# Patient Record
Sex: FEMALE | Race: AMERICAN INDIAN OR ALASKA NATIVE
[De-identification: names, ages, dates, MRNs, and addresses within clinical notes are randomized per-mention and may not be internally consistent; named-entity substitution may affect disease eponyms.]

---

## 2018-05-26 ENCOUNTER — HOSPITAL ENCOUNTER (INPATIENT)
Dept: HOSPITAL 5 - ED | Age: 73
LOS: 3 days | Discharge: HOME | DRG: 436 | End: 2018-05-29
Attending: INTERNAL MEDICINE | Admitting: INTERNAL MEDICINE
Payer: MEDICARE

## 2018-05-26 DIAGNOSIS — C78.7: ICD-10-CM

## 2018-05-26 DIAGNOSIS — I10: ICD-10-CM

## 2018-05-26 DIAGNOSIS — Z23: ICD-10-CM

## 2018-05-26 DIAGNOSIS — Z90.721: ICD-10-CM

## 2018-05-26 DIAGNOSIS — E11.9: ICD-10-CM

## 2018-05-26 DIAGNOSIS — E87.1: ICD-10-CM

## 2018-05-26 DIAGNOSIS — C25.9: Primary | ICD-10-CM

## 2018-05-26 DIAGNOSIS — Z79.4: ICD-10-CM

## 2018-05-26 LAB
ALBUMIN SERPL-MCNC: 3.7 G/DL (ref 3.9–5)
ALT SERPL-CCNC: 10 UNITS/L (ref 7–56)
BASOPHILS # (AUTO): 0.1 K/MM3 (ref 0–0.1)
BASOPHILS NFR BLD AUTO: 0.9 % (ref 0–1.8)
BILIRUB UR QL STRIP: (no result)
BLOOD UR QL VISUAL: (no result)
BUN SERPL-MCNC: 7 MG/DL (ref 7–17)
BUN/CREAT SERPL: 18 %
CALCIUM SERPL-MCNC: 8.7 MG/DL (ref 8.4–10.2)
EOSINOPHIL # BLD AUTO: 0.1 K/MM3 (ref 0–0.4)
EOSINOPHIL NFR BLD AUTO: 1.2 % (ref 0–4.3)
HCT VFR BLD CALC: 41.9 % (ref 30.3–42.9)
HEMOLYSIS INDEX: 0
HGB BLD-MCNC: 14 GM/DL (ref 10.1–14.3)
HYALINE CASTS #/AREA URNS LPF: 1 /LPF
LYMPHOCYTES # BLD AUTO: 1.8 K/MM3 (ref 1.2–5.4)
LYMPHOCYTES NFR BLD AUTO: 24.7 % (ref 13.4–35)
MCH RBC QN AUTO: 29 PG (ref 28–32)
MCHC RBC AUTO-ENTMCNC: 33 % (ref 30–34)
MCV RBC AUTO: 88 FL (ref 79–97)
MONOCYTES # (AUTO): 0.8 K/MM3 (ref 0–0.8)
MONOCYTES % (AUTO): 10.2 % (ref 0–7.3)
PH UR STRIP: 5 [PH] (ref 5–7)
PLATELET # BLD: 193 K/MM3 (ref 140–440)
PROT UR STRIP-MCNC: (no result) MG/DL
RBC # BLD AUTO: 4.79 M/MM3 (ref 3.65–5.03)
RBC #/AREA URNS HPF: 3 /HPF (ref 0–6)
UROBILINOGEN UR-MCNC: < 2 MG/DL (ref ?–2)
WBC #/AREA URNS HPF: 2 /HPF (ref 0–6)

## 2018-05-26 PROCEDURE — 85027 COMPLETE CBC AUTOMATED: CPT

## 2018-05-26 PROCEDURE — 80048 BASIC METABOLIC PNL TOTAL CA: CPT

## 2018-05-26 PROCEDURE — 82962 GLUCOSE BLOOD TEST: CPT

## 2018-05-26 PROCEDURE — A9577 INJ MULTIHANCE: HCPCS

## 2018-05-26 PROCEDURE — 90732 PPSV23 VACC 2 YRS+ SUBQ/IM: CPT

## 2018-05-26 PROCEDURE — 96374 THER/PROPH/DIAG INJ IV PUSH: CPT

## 2018-05-26 PROCEDURE — 36415 COLL VENOUS BLD VENIPUNCTURE: CPT

## 2018-05-26 PROCEDURE — 74177 CT ABD & PELVIS W/CONTRAST: CPT

## 2018-05-26 PROCEDURE — 74183 MRI ABD W/O CNTR FLWD CNTR: CPT

## 2018-05-26 PROCEDURE — 86304 IMMUNOASSAY TUMOR CA 125: CPT

## 2018-05-26 PROCEDURE — 85025 COMPLETE CBC W/AUTO DIFF WBC: CPT

## 2018-05-26 PROCEDURE — 86301 IMMUNOASSAY TUMOR CA 19-9: CPT

## 2018-05-26 PROCEDURE — 83036 HEMOGLOBIN GLYCOSYLATED A1C: CPT

## 2018-05-26 PROCEDURE — 80053 COMPREHEN METABOLIC PANEL: CPT

## 2018-05-26 PROCEDURE — 81001 URINALYSIS AUTO W/SCOPE: CPT

## 2018-05-26 NOTE — EMERGENCY DEPARTMENT REPORT
HPI





- General


Chief Complaint: Abdominal Pain


Time Seen by Provider: 18 09:18





- HPI


HPI: 





Room 18





The patient is a 72-year-old female presenting with the chief complaint of 

abdominal pain.  The patient states she's had diffuse abdominal pain since 

February of this year.  Patient describes the pain as aching with intermittent 

sharp components.  The patient states her pain improved after being changed to 

a different diabetes medication but returned and has been constant since 2018.  Patient admits to occasional nausea but denies vomiting or diarrhea.  

Patient states she's been constipated the last 2-3 weeks.  The patient states 

her last BM occurred approximately 2 days ago after taking a laxative.  Patient 

denies any history of fever or dysuria.  The patient states ibuprofen helps "a 

little."





Location: Abdomen


Duration: Months


Quality: Aching/sharp


Severity: 9/10


Modifying factors: [see above]


Context: [see above]


Mode of transportation: [not driving]





ED Past Medical Hx





- Past Medical History


Previous Medical History?: Yes


Hx Hypertension: Yes


Hx Diabetes: Yes





- Surgical History


Past Surgical History?: No


Additional Surgical History:  x 2.  right oophorectomy.  tonsilectomy





- Family History


Family history: no significant





- Social History


Smoking Status: Never Smoker


Substance Use Type: Alcohol (occasional)





- Medications


Home Medications: 


 Home Medications











 Medication  Instructions  Recorded  Confirmed  Last Taken  Type


 


Carvedilol [Coreg] 12.5 mg PO BID 13 05:00 History


 


Carvedilol [Coreg] 25 mg PO BID #90 tablet 13  Unknown Rx


 


Glimepiride [Amaryl] 4 mg PO QAM 13 05:00 History


 


Losartan/Hydrochlorothiazide 1 each PO QDAY 13 09:30 

History





[Hyzaar 100-25 Tablet]     


 


Sitagliptin Phos/Metformin HCl 1 each PO BID 13 18:00 

History





[Janumet  mg Tablet]     


 


amLODIPine [Norvasc] 5 mg PO DAILY #90 tab 13  Unknown Rx


 


traMADol [Ultram 50 MG tab] 50 mg PO Q6HR PRN #20 tablet 08/27/15  Unknown Rx














ED Review of Systems


ROS: 


Stated complaint: ABD/BACK PAIN


Other details as noted in HPI





Constitutional: denies: fever


Gastrointestinal: abdominal pain, nausea, constipation.  denies: vomiting, 

diarrhea





Physical Exam





- Physical Exam


Vital Signs: 


 Vital Signs











  18





  07:18


 


Temperature 97.5 F L


 


Pulse Rate 118 H


 


Respiratory 20





Rate 


 


Blood Pressure 202/118


 


O2 Sat by Pulse 96





Oximetry 











Physical Exam: 





GENERAL: The patient is well-developed well-nourished female lying on stretcher 

not appearing to be in acute distress. []


HEENT: Normocephalic.  Atraumatic.  Extraocular motions are intact.  Patient 

has moist mucous membranes.


NECK: Supple.  Trachea midline


CHEST/LUNGS: Clear to auscultation.  There is no respiratory distress noted.


HEART/CARDIOVASCULAR: Regular.  There is no tachycardia.  There is no gallop 

rub or murmur.


ABDOMEN: Abdomen is soft, with mild discomfort to palpation in the right lower 

quadrant, suprapubic and left lower quadrant.  Patient has normal bowel sounds.

  There is no abdominal distention.


SKIN: There is no rash.  There is no edema.  There is no diaphoresis.


NEURO: The patient is awake, alert, and oriented.  The patient is cooperative.  

The patient has normal speech


MUSCULOSKELETAL: There is no CVA tenderness.  There is no evidence of acute 

injury.





ED Course


 Vital Signs











  18





  07:18


 


Temperature 97.5 F L


 


Pulse Rate 118 H


 


Respiratory 20





Rate 


 


Blood Pressure 202/118


 


O2 Sat by Pulse 96





Oximetry 














- Consultations


Consultation #1: 





18 12:24


Patient's primary physician Dr. Laina cuenca





ED Medical Decision Making





- Lab Data


Result diagrams: 


 18 07:24





 18 07:24





 Laboratory Tests











  18





  07:24 07:24 07:36


 


WBC  7.4  


 


RBC  4.79  


 


Hgb  14.0  


 


Hct  41.9  


 


MCV  88  


 


MCH  29  


 


MCHC  33  


 


RDW  12.3 L  


 


Plt Count  193  


 


Lymph % (Auto)  24.7  


 


Mono % (Auto)  10.2 H  


 


Eos % (Auto)  1.2  


 


Baso % (Auto)  0.9  


 


Lymph #  1.8  


 


Mono #  0.8  


 


Eos #  0.1  


 


Baso #  0.1  


 


Seg Neutrophils %  63.0  


 


Seg Neutrophils #  4.7  


 


Sodium   131 L 


 


Potassium   3.6 


 


Chloride   90.9 L 


 


Carbon Dioxide   25 


 


Anion Gap   19 


 


BUN   7 


 


Creatinine   0.4 L 


 


Estimated GFR   > 60 


 


BUN/Creatinine Ratio   18 


 


Glucose   208 H 


 


Calcium   8.7 


 


Total Bilirubin   0.40 


 


AST   13 


 


ALT   10 


 


Alkaline Phosphatase   79 


 


Total Protein   6.5 


 


Albumin   3.7 L 


 


Albumin/Globulin Ratio   1.3 


 


Urine Color    Yellow


 


Urine Turbidity    Clear


 


Urine pH    5.0


 


Ur Specific Gravity    1.020


 


Urine Protein    <15 mg/dl


 


Urine Glucose (UA)    >=500


 


Urine Ketones    Tr


 


Urine Blood    Neg


 


Urine Nitrite    Neg


 


Urine Bilirubin    Neg


 


Urine Urobilinogen    < 2.0


 


Ur Leukocyte Esterase    Neg


 


Urine WBC (Auto)    2.0


 


Urine RBC (Auto)    3.0


 


U Epithel Cells (Auto)    1.0


 


Hyaline Casts    1














- Radiology Data


Radiology results: report reviewed (CT abdomen and pelvis), image reviewed (CT 

abdomen and pelvis)





CT abdomen and pelvis (discussed with radiologist)-pancreatic mass with 

multiple lesions in the liver concerning for metastatic disease





- Differential Diagnosis


constipation, diverticulitis, irritable bowel syndrome, Crohn's disease


Critical care attestation.: 


If time is entered above; I have spent that time in minutes in the direct care 

of this critically ill patient, excluding procedure time.








ED Disposition


Clinical Impression: 


 Abdominal pain, Pancreatic mass, Liver lesion





Disposition:  OP ADMIT IP TO THIS HOSP


Is pt being admited?: Yes


Does the pt Need Aspirin: Yes


Condition: Fair


Instructions:  Abdominal Pain (ED)


Referrals: 


MARIFER ESQUEDA MD [Primary Care Provider] - 3-5 Days


Time of Disposition: 12:28 (hospitalist notified (Dr Esqueda))

## 2018-05-27 LAB
ALBUMIN SERPL-MCNC: 3.1 G/DL (ref 3.9–5)
ALT SERPL-CCNC: 9 UNITS/L (ref 7–56)
BASOPHILS # (AUTO): 0.1 K/MM3 (ref 0–0.1)
BASOPHILS NFR BLD AUTO: 1.2 % (ref 0–1.8)
BUN SERPL-MCNC: 10 MG/DL (ref 7–17)
BUN/CREAT SERPL: 17 %
CALCIUM SERPL-MCNC: 8.5 MG/DL (ref 8.4–10.2)
EOSINOPHIL # BLD AUTO: 0.1 K/MM3 (ref 0–0.4)
EOSINOPHIL NFR BLD AUTO: 2.7 % (ref 0–4.3)
HCT VFR BLD CALC: 36.5 % (ref 30.3–42.9)
HEMOLYSIS INDEX: 2
HGB BLD-MCNC: 12.9 GM/DL (ref 10.1–14.3)
LYMPHOCYTES # BLD AUTO: 1 K/MM3 (ref 1.2–5.4)
LYMPHOCYTES NFR BLD AUTO: 19.4 % (ref 13.4–35)
MCH RBC QN AUTO: 30 PG (ref 28–32)
MCHC RBC AUTO-ENTMCNC: 35 % (ref 30–34)
MCV RBC AUTO: 86 FL (ref 79–97)
MONOCYTES # (AUTO): 0.5 K/MM3 (ref 0–0.8)
MONOCYTES % (AUTO): 9.1 % (ref 0–7.3)
PLATELET # BLD: 156 K/MM3 (ref 140–440)
RBC # BLD AUTO: 4.27 M/MM3 (ref 3.65–5.03)

## 2018-05-27 PROCEDURE — 3E0234Z INTRODUCTION OF SERUM, TOXOID AND VACCINE INTO MUSCLE, PERCUTANEOUS APPROACH: ICD-10-PCS | Performed by: INTERNAL MEDICINE

## 2018-05-27 RX ADMIN — CARVEDILOL SCH MG: 25 TABLET, FILM COATED ORAL at 00:20

## 2018-05-27 RX ADMIN — Medication SCH ML: at 10:54

## 2018-05-27 RX ADMIN — CARVEDILOL SCH MG: 25 TABLET, FILM COATED ORAL at 10:46

## 2018-05-27 RX ADMIN — Medication SCH ML: at 23:30

## 2018-05-27 RX ADMIN — HYDROCHLOROTHIAZIDE SCH: 25 TABLET ORAL at 10:51

## 2018-05-27 RX ADMIN — LOSARTAN POTASSIUM SCH MG: 50 TABLET, FILM COATED ORAL at 10:45

## 2018-05-27 RX ADMIN — SODIUM CHLORIDE SCH MLS/HR: 0.9 INJECTION, SOLUTION INTRAVENOUS at 23:44

## 2018-05-27 RX ADMIN — ACETAMINOPHEN PRN MG: 325 TABLET ORAL at 17:21

## 2018-05-27 RX ADMIN — CARVEDILOL SCH MG: 25 TABLET, FILM COATED ORAL at 23:31

## 2018-05-27 RX ADMIN — AMLODIPINE BESYLATE SCH MG: 5 TABLET ORAL at 10:45

## 2018-05-27 RX ADMIN — HYDROCHLOROTHIAZIDE SCH MG: 25 TABLET ORAL at 10:45

## 2018-05-27 NOTE — EVENT NOTE
Date: 05/27/18


- full consult dictated


- based on ct scan consider liver lesion bx vs EUS as outpt


- will follow

## 2018-05-27 NOTE — PROGRESS NOTE
Assessment and Plan


Assessment and plan: 


Ms. Turner is 71 yo woman with a history of IDDM, hypertension who pw abd pains. 

Admitted to investigate pancreatic mass with suspected liver mets





-Suspect pancreatic cancer with liver mets: will eventually need bx and EUS 

which is not done here


-Abd pains: mri pending, gi is following


-IDDM; ssi, ada diet


-Hypertension: low salt diet, continue antihypertensives.








History


Interval history: 


Patient was seen and examined. Follow-up on current diagnosis. Overnight 

uneventful. Patient denies any chest pain, shortness breath, nausea/vomiting or 

severe headaches. Imaging, nursing note, chart, labs and old chart reviewed. 

Discussed with patient. 











Hospitalist Physical





- Physical exam


Narrative exam: 


GEN: WDWN, NAD, Awake, Alert, Orientated x 3


HEENT: NCAT, EOMI, PERRL, OP Clear 


NECK: supple, no adenopathy, no thyromegaly, no JVD 


CVS/HEART: RRR, normal S1S2, pulses present bilaterally 


CHEST/LUNGS: CTA B, Symmetrical chest expansion, good air entry bilaterally 


GI/Abdomen: soft, distended, diffuse tenderness good bowel sounds, no guarding 

or rebound 


/Bladder: no suprapubic tenderness, no CVA or paraspinal tenderness 


EXT/Skin: no c/c/e, no obvious rash 


MSK: FROM x 4 


Neuro: CN 2-12 grossly intact, no new focal deficits 


Psych: calm








- Constitutional


Vitals: 


 











Temp Pulse Resp BP Pulse Ox


 


 98.4 F   93 H  19   139/79   100 


 


 05/27/18 07:00  05/27/18 07:00  05/27/18 07:00  05/27/18 10:46  05/27/18 07:00














Results





- Labs


CBC & Chem 7: 


 05/27/18 04:58





 05/27/18 04:58


Labs: 


 Laboratory Last Values











WBC  5.4 K/mm3 (4.5-11.0)   05/27/18  04:58    


 


RBC  4.27 M/mm3 (3.65-5.03)   05/27/18  04:58    


 


Hgb  12.9 gm/dl (10.1-14.3)   05/27/18  04:58    


 


Hct  36.5 % (30.3-42.9)   05/27/18  04:58    


 


MCV  86 fl (79-97)   05/27/18  04:58    


 


MCH  30 pg (28-32)   05/27/18  04:58    


 


MCHC  35 % (30-34)  H  05/27/18  04:58    


 


RDW  12.4 % (13.2-15.2)  L  05/27/18  04:58    


 


Plt Count  156 K/mm3 (140-440)   05/27/18  04:58    


 


Lymph % (Auto)  19.4 % (13.4-35.0)   05/27/18  04:58    


 


Mono % (Auto)  9.1 % (0.0-7.3)  H  05/27/18  04:58    


 


Eos % (Auto)  2.7 % (0.0-4.3)   05/27/18  04:58    


 


Baso % (Auto)  1.2 % (0.0-1.8)   05/27/18  04:58    


 


Lymph #  1.0 K/mm3 (1.2-5.4)  L  05/27/18  04:58    


 


Mono #  0.5 K/mm3 (0.0-0.8)   05/27/18  04:58    


 


Eos #  0.1 K/mm3 (0.0-0.4)   05/27/18  04:58    


 


Baso #  0.1 K/mm3 (0.0-0.1)   05/27/18  04:58    


 


Seg Neutrophils %  67.6 % (40.0-70.0)   05/27/18  04:58    


 


Seg Neutrophils #  3.7 K/mm3 (1.8-7.7)   05/27/18  04:58    


 


Sodium  136 mmol/L (137-145)  L  05/27/18  04:58    


 


Potassium  4.6 mmol/L (3.6-5.0)  D 05/27/18  04:58    


 


Chloride  97.4 mmol/L ()  L  05/27/18  04:58    


 


Carbon Dioxide  23 mmol/L (22-30)   05/27/18  04:58    


 


Anion Gap  20 mmol/L  05/27/18  04:58    


 


BUN  10 mg/dL (7-17)   05/27/18  04:58    


 


Creatinine  0.6 mg/dL (0.7-1.2)  L  05/27/18  04:58    


 


Estimated GFR  > 60 ml/min  05/27/18  04:58    


 


BUN/Creatinine Ratio  17 %  05/27/18  04:58    


 


Glucose  212 mg/dL ()  H  05/27/18  04:58    


 


POC Glucose  224  ()  H  05/27/18  06:12    


 


Hemoglobin A1c  8.5 % (4-6)  H  05/26/18  23:42    


 


Calcium  8.5 mg/dL (8.4-10.2)   05/27/18  04:58    


 


Total Bilirubin  0.50 mg/dL (0.1-1.2)   05/27/18  04:58    


 


AST  11 units/L (5-40)   05/27/18  04:58    


 


ALT  9 units/L (7-56)   05/27/18  04:58    


 


Alkaline Phosphatase  70 units/L ()   05/27/18  04:58    


 


Total Protein  5.6 g/dL (6.3-8.2)  L  05/27/18  04:58    


 


Albumin  3.1 g/dL (3.9-5)  L  05/27/18  04:58    


 


Albumin/Globulin Ratio  1.2 %  05/27/18  04:58    


 


Urine Color  Yellow  (Yellow)   05/26/18  07:36    


 


Urine Turbidity  Clear  (Clear)   05/26/18  07:36    


 


Urine pH  5.0  (5.0-7.0)   05/26/18  07:36    


 


Ur Specific Gravity  1.020  (1.003-1.030)   05/26/18  07:36    


 


Urine Protein  <15 mg/dl mg/dL (Negative)   05/26/18  07:36    


 


Urine Glucose (UA)  >=500 mg/dL (Negative)   05/26/18  07:36    


 


Urine Ketones  Tr mg/dL (Negative)   05/26/18  07:36    


 


Urine Blood  Neg  (Negative)   05/26/18  07:36    


 


Urine Nitrite  Neg  (Negative)   05/26/18  07:36    


 


Urine Bilirubin  Neg  (Negative)   05/26/18  07:36    


 


Urine Urobilinogen  < 2.0 mg/dL (<2.0)   05/26/18  07:36    


 


Ur Leukocyte Esterase  Neg  (Negative)   05/26/18  07:36    


 


Urine WBC (Auto)  2.0 /HPF (0.0-6.0)   05/26/18  07:36    


 


Urine RBC (Auto)  3.0 /HPF (0.0-6.0)   05/26/18  07:36    


 


U Epithel Cells (Auto)  1.0 /HPF (0-13.0)   05/26/18  07:36    


 


Hyaline Casts  1 /LPF  05/26/18  07:36

## 2018-05-27 NOTE — HEM/ONC CONSULTATION
History of Present Illness





- Reason for Consult


Consult date: 18


Requesting physician: PRAVEEN BUSCH





- History of Present Illness





73 yo patient who appears younger than stated age presents with epigastric 

pain. 


Report from CT done in ER suggests Pancreatic mass with liver lesions. 


CA 19-9 pending


- Recommend CT guided bx 


- If discharged, please followup with Dr Amarilys Jauregui,  I am concerned patient 

will be lost to followup. 





Past History


Past Medical History: diabetes, hypertension


Past Surgical History:  (- right oophorectomy)


Social history: no significant social history


Family history: no significant family history





Medications and Allergies


 Allergies











Allergy/AdvReac Type Severity Reaction Status Date / Time


 


No Known Allergies Allergy   Verified 18 07:18











 Home Medications











 Medication  Instructions  Recorded  Confirmed  Last Taken  Type


 


Losartan/Hydrochlorothiazide 100 mg PO QDAY 13 17:24 

History





[Hyzaar 100-25 Tablet]     


 


amLODIPine [Norvasc] 5 mg PO DAILY #90 tab 13 Rx


 


Carvedilol [Coreg] 25 mg PO BID 18 History


 


HumaLOG Mix 75/25 Vial 20 units SUB-Q QHS 18 History


 


HumaLOG Mix 75/25 Vial 30 units SUB-Q QAM 18 History











Active Meds: 


Active Medications





Acetaminophen (Tylenol)  650 mg PO Q4H PRN


   PRN Reason: Pain MILD(1-3)/Fever >100.5/HA


Amlodipine Besylate (Norvasc)  5 mg PO DAILY [facility]


   Last Admin: 18 10:45 Dose:  5 mg


Carvedilol (Coreg)  25 mg PO BID [facility]


   Last Admin: 18 10:46 Dose:  25 mg


Hydrochlorothiazide (Hctz)  25 mg PO QDAY [facility]


   Last Admin: 18 10:51 Dose:  Not Given


Sodium Chloride (Nacl 0.9% 1000 Ml)  1,000 mls @ 100 mls/hr IV AS DIRECT JENNIFER


Insulin Human Lispro (Humalog)  0 unit SUB-Q ACHS [facility]; Protocol


   Last Admin: 18 12:47 Dose:  4 unit


Losartan Potassium (Cozaar)  100 mg PO QDAY [facility]


   Last Admin: 18 10:45 Dose:  100 mg


Metoclopramide HCl (Reglan)  10 mg IV Q6H PRN


   PRN Reason: Nausea And Vomiting


Morphine Sulfate (Morphine)  2 mg IV Q4H PRN


   PRN Reason: Pain, Moderate (4-6)


Ondansetron HCl (Zofran)  4 mg IV Q8H PRN


   PRN Reason: Nausea And Vomiting


Oxycodone/Acetaminophen (Percocet 5/325)  1 tab PO Q6H PRN


   PRN Reason: Pain, Moderate (4-6)


Promethazine HCl (Phenergan)  25 mg AR Q6H PRN


   PRN Reason: N/V IF NPO AND NO IV ACCESS


Sodium Chloride (Sodium Chloride Flush Syringe 10 Ml)  10 ml IV BID [facility]


   Last Admin: 18 10:54 Dose:  10 ml


Sodium Chloride (Sodium Chloride Flush Syringe 10 Ml)  10 ml IV PRN PRN


   PRN Reason: LINE FLUSH











Review of Systems


All systems: negative (- she denies wt loss, she reports abdominal pain)


Constitutional: weight loss (she denies wt loss, she reports abdominal pain - 

last colonscopy > 10 years ago)





Exam





- Constitutional


Vitals: 


 Last Vital Signs











Temp  98.4 F   18 07:00


 


Pulse  93 H  18 07:00


 


Resp  19   18 07:00


 


BP  139/79   18 10:46


 


Pulse Ox  100   18 07:00











Pain Intensity (0-10): denies any pain


General appearance: no acute distress, mild distress


Performance status: 0-fully active





- EENT


Eyes: PERRL, EOM intact


ENT: hearing intact, clear oral mucosa, dentition normal





- Neck


Neck: supple, normal ROM





- Respiratory


Respiratory: bilateral: CTA





- Cardiovascular


Rhythm: regular


Heart Sounds: Present: S1 & S2


Extremities: no ischemia, Full ROM





- Gastrointestinal


General gastrointestinal: Present: soft, non-tender





Results





- Labs


lab Results: 


 Laboratory Results - last 24 hr











  18





  21:25 23:42 04:58


 


WBC    5.4


 


RBC    4.27


 


Hgb    12.9


 


Hct    36.5


 


MCV    86


 


MCH    30


 


MCHC    35 H


 


RDW    12.4 L


 


Plt Count    156


 


Lymph % (Auto)    19.4


 


Mono % (Auto)    9.1 H


 


Eos % (Auto)    2.7


 


Baso % (Auto)    1.2


 


Lymph #    1.0 L


 


Mono #    0.5


 


Eos #    0.1


 


Baso #    0.1


 


Seg Neutrophils %    67.6


 


Seg Neutrophils #    3.7


 


Sodium   


 


Potassium   


 


Chloride   


 


Carbon Dioxide   


 


Anion Gap   


 


BUN   


 


Creatinine   


 


Estimated GFR   


 


BUN/Creatinine Ratio   


 


Glucose   


 


POC Glucose  244 H  


 


Hemoglobin A1c   8.5 H 


 


Calcium   


 


Total Bilirubin   


 


AST   


 


ALT   


 


Alkaline Phosphatase   


 


Total Protein   


 


Albumin   


 


Albumin/Globulin Ratio   














  18





  04:58 06:12


 


WBC  


 


RBC  


 


Hgb  


 


Hct  


 


MCV  


 


MCH  


 


MCHC  


 


RDW  


 


Plt Count  


 


Lymph % (Auto)  


 


Mono % (Auto)  


 


Eos % (Auto)  


 


Baso % (Auto)  


 


Lymph #  


 


Mono #  


 


Eos #  


 


Baso #  


 


Seg Neutrophils %  


 


Seg Neutrophils #  


 


Sodium  136 L 


 


Potassium  4.6  D 


 


Chloride  97.4 L 


 


Carbon Dioxide  23 


 


Anion Gap  20 


 


BUN  10 


 


Creatinine  0.6 L 


 


Estimated GFR  > 60 


 


BUN/Creatinine Ratio  17 


 


Glucose  212 H 


 


POC Glucose   224 H


 


Hemoglobin A1c  


 


Calcium  8.5 


 


Total Bilirubin  0.50 


 


AST  11 


 


ALT  9 


 


Alkaline Phosphatase  70 


 


Total Protein  5.6 L 


 


Albumin  3.1 L 


 


Albumin/Globulin Ratio  1.2 














Assessment and Plan





-- CT scan (preliminary) suggests pancreatic mass, with liver lesions. 


-- Performance status is favorable


-- Recommend CT guided bx


-- If workup is planned outpatient, please refer to Dr Amarilys Moreno for 

followup.

## 2018-05-28 RX ADMIN — SODIUM CHLORIDE SCH MLS/HR: 0.9 INJECTION, SOLUTION INTRAVENOUS at 09:53

## 2018-05-28 RX ADMIN — Medication SCH ML: at 22:32

## 2018-05-28 RX ADMIN — ACETAMINOPHEN PRN MG: 325 TABLET ORAL at 14:08

## 2018-05-28 RX ADMIN — ACETAMINOPHEN PRN MG: 325 TABLET ORAL at 19:45

## 2018-05-28 RX ADMIN — Medication SCH ML: at 09:54

## 2018-05-28 RX ADMIN — CARVEDILOL SCH MG: 25 TABLET, FILM COATED ORAL at 09:50

## 2018-05-28 RX ADMIN — LOSARTAN POTASSIUM SCH MG: 50 TABLET, FILM COATED ORAL at 09:51

## 2018-05-28 RX ADMIN — SODIUM CHLORIDE SCH MLS/HR: 0.9 INJECTION, SOLUTION INTRAVENOUS at 22:31

## 2018-05-28 RX ADMIN — HYDROCHLOROTHIAZIDE SCH MG: 25 TABLET ORAL at 09:51

## 2018-05-28 RX ADMIN — AMLODIPINE BESYLATE SCH MG: 5 TABLET ORAL at 09:52

## 2018-05-28 RX ADMIN — CARVEDILOL SCH MG: 25 TABLET, FILM COATED ORAL at 22:30

## 2018-05-28 NOTE — HISTORY AND PHYSICAL REPORT
CHIEF COMPLAINT:  Abdominal pain.



HISTORY OF PRESENT ILLNESS:  A 72-year-old -American female well known 
to me from the office for the last 6-7 years, comes in for diffuse abdominal 
pain for the last 3 months.  My office was trying to arrange for a CAT scan of 
the abdomen next Tuesday.  In the meantime, the patient comes in for abdominal 
pain to the Emergency Room.  The patient has uncontrolled diabetes and the 
patient was recently started on insulin 75/25, 30 units in the morning and 20 
units in the evening.  The patient also has hypertension.  The pain is off and 
on and going on for the last 3 months, diffuse in nature, intermittent in 
nature.  Pain is about 6 on a scale of 1-10.  No constipation.  No nausea, no 
vomiting.  No exacerbating factors.



PAST MEDICAL HISTORY:  Significant for hypertension and diabetes.



PAST SURGICAL HISTORY:  Significant for  x 2, right oophorectomy, 
tonsillectomy.



FAMILY HISTORY:  No significant family history.



SOCIAL HISTORY:  Does not smoke.  Alcohol occasionally.



CURRENT MEDICATIONS:  Coreg 12.5 b.i.d., glimepiride 4 mg daily, Janumet 50/500 
b.i.d., amlodipine 5 mg p.o. daily, tramadol 50 mg q.6 hours p.r.n., losartan 1 
tablet daily 100/25 and Humalog mix 75/24 30 in the morning and 25 in the 
evening.



REVIEW OF SYSTEMS:  Significant for diffuse abdominal pain.  No recent weight 
loss, no loss of appetite.  14-point review of systems is otherwise negative.



PHYSICAL EXAMINATION:

GENERAL:  Elderly female, looks younger than her age.

VITAL SIGNS:  Blood pressure is 146/82, temperature is 98, pulse is 95, 
respirations 22.

HEENT:  Unremarkable.  Pupils equal and reactive.

NECK:  Supple, no lymphadenopathy, no thyromegaly.

LUNGS:  Clear to auscultation and percussion.  Good air entry.

CARDIOVASCULAR:  S1, S2 heard.  No gallop, no murmur, no rub.  Apical impulse 
in left fifth intercostal space and midclavicular line.

ABDOMEN:  Soft and benign.  No hepatosplenomegaly.  No guarding, no rigidity.  
Hernial orifices are normal.

EXTREMITIES:  Good pedal pulses.  No pedal edema.

CENTRAL NERVOUS SYSTEM:  Alert and oriented x 4, nonfocal exam.

SKIN:  Normal.



LABORATORY DATA:  Significant for CT of the abdomen shows pancreatic mass with 
multiple lesions in the liver concerning for metastatic disease.  Labs are 
significant for white count of 7400, hemoglobin of 14, and hematocrit of 41.9, 
platelet count of 193.  Sodium is 131, glucose is 208.  Albumin is 2.7.  Urine 
shows glucose of more than 500.



ASSESSMENT AND PLAN:

1.  A pancreatic mass with liver metastasis, possible pancreatic primary with 
liver mets.  Hematology/Oncology consult requested.  Cancer markers ordered.  
IV fluids for now.

2.  Insulin-dependent diabetes.  Continue insulin coverage.

3.  Hypertension.  Continue antihypertensives.

4.  Hyponatremia, mild.  Should correct with IV fluids.

5.  Deep venous thrombosis prophylaxis, heparin 5000 q.12h

6.  Malnutrition, mild.  Dietitian consult requested.



ADDENDUM:  MRI of the abdomen ordered for further delineation of the tumor.  We 
will ask for a CT-guided biopsy.
[facility]

## 2018-05-28 NOTE — DISCHARGE SUMMARY
Providers





- Providers


Date of Admission: 


05/26/18 16:50





Date of discharge: 05/29/18


Attending physician: 


PRAVEEN BUSCH





 





05/26/18 23:20


Consult to Physician [CONS] Routine 


   Comment: 


   Consulting Provider: STEVEN OCAMPO


   Physician Instructions: 


   Reason For Exam: Pancreatic cancer with mets





05/26/18 23:23


Consult to Physician [CONS] Routine 


   Comment: 


   Consulting Provider: AMOS TORRES


   Physician Instructions: 


   Reason For Exam: Pancreatic mass











Primary care physician: 


MARIFER ESQUEDA








Hospitalization


Condition: Stable


Hospital course: 


Ms. Turner is 71 yo woman with a history of IDDM, hypertension who pw abd pains. 

Admitted to investigate pancreatic mass with suspected liver mets





-Suspect pancreatic cancer with liver mets: will eventually need bx and EUS 

which is not done here


-Abd pains: gi is following


-IDDM; ssi, ada diet


-Hypertension: low salt diet, continue antihypertensives.





d/w GI, Dr. Chi Gaytan, no need for mri, he spoke with radiology and lesion 

maybe unreachable by ct guided bx, so she needs EUS (not done here) and his 

office will call her to make arrangement but she must follow up. I will notify 

her pcp, Dr. Esqueda. So, Dr. Gaytan ok to discharge





Disposition: DC-01 TO HOME OR SELFCARE


Time spent for discharge: 35 min





Core Measure Documentation





- Palliative Care


Palliative Care/ Comfort Measures: Not Applicable





- Core Measures


Any of the following diagnoses?: none





- VTE Discharge Requirements


Deep Vein Thrombosis/Pulmonary Embolism Present on Admission: No


Has pt received <5 days of overlap therapy or INR<2.0: No


Anticoagulant overlap therapy prescribed at discharge: No


Contraindication No Overlap Therapy order at DC: Not Indicated





Exam





- Physical Exam


Narrative exam: 


GEN: WDWN, NAD, Awake, Alert, Orientated x 3


HEENT: NCAT, EOMI, PERRL, OP Clear 


NECK: supple, no adenopathy, no thyromegaly, no JVD 


CVS/HEART: RRR, normal S1S2, pulses present bilaterally 


CHEST/LUNGS: CTA B, Symmetrical chest expansion, good air entry bilaterally 


GI/Abdomen: soft, distended, diffuse tenderness good bowel sounds, no guarding 

or rebound 


/Bladder: no suprapubic tenderness, no CVA or paraspinal tenderness 


EXT/Skin: no c/c/e, no obvious rash 


MSK: FROM x 4 


Neuro: CN 2-12 grossly intact, no new focal deficits 


Psych: calm








- Constitutional


Vitals: 


 











Temp Pulse Resp BP Pulse Ox


 


 97.4 F L  87   20   175/65   100 


 


 05/28/18 07:54  05/28/18 09:52  05/28/18 07:54  05/28/18 09:52  05/28/18 07:54














Plan


Activity: other (no strenous activity)


Diet: low salt


Additional Instructions: You need an Endoscopic Ultrasound (EUS), call Dr. Chi Gaytan's office to arrange.


Follow up with: 


STEVEN OCAMPO MD [Staff Physician] - 7 Days


MARIFER ESQUEDA MD [Primary Care Provider] - 3-5 Days


ROBERT GAYTAN MD [Staff Physician] - 7 Days


Prescriptions: 


oxyCODONE /ACETAMINOPHEN [Percocet 5/325] 1 tab PO Q4HR PRN #20 tab


 PRN Reason: Pain , Severe (7-10)


Polyethylene Glycol 3350 [Miralax 3350] 17 gm PO QDAY PRN #30 packet


 PRN Reason: Constipation

## 2018-05-28 NOTE — PROGRESS NOTE
Assessment and Plan


Assessment and plan: 


Ms. Turner is 73 yo woman with a history of IDDM, hypertension who pw abd pains. 

Admitted to investigate pancreatic mass with suspected liver mets





-Suspect pancreatic cancer with liver mets: will eventually need bx and EUS 

which is not done here


-Abd pains: mri pending, gi is following


-IDDM; ssi, ada diet


-Hypertension: low salt diet, continue antihypertensives.








History


Interval history: 


Patient was seen and examined. Follow-up on current diagnosis. Overnight 

uneventful. Patient denies any chest pain, shortness breath, nausea/vomiting or 

severe headaches. Imaging, nursing note, chart, labs and old chart reviewed. 

Discussed with patient. 











Hospitalist Physical





- Physical exam


Narrative exam: 


GEN: WDWN, NAD, Awake, Alert, Orientated x 3


HEENT: NCAT, EOMI, PERRL, OP Clear 


NECK: supple, no adenopathy, no thyromegaly, no JVD 


CVS/HEART: RRR, normal S1S2, pulses present bilaterally 


CHEST/LUNGS: CTA B, Symmetrical chest expansion, good air entry bilaterally 


GI/Abdomen: soft, distended, diffuse tenderness good bowel sounds, no guarding 

or rebound 


/Bladder: no suprapubic tenderness, no CVA or paraspinal tenderness 


EXT/Skin: no c/c/e, no obvious rash 


MSK: FROM x 4 


Neuro: CN 2-12 grossly intact, no new focal deficits 


Psych: calm








- Constitutional


Vitals: 


 











Temp Pulse Resp BP Pulse Ox


 


 97.4 F L  87   20   175/65   100 


 


 05/28/18 07:54  05/28/18 09:52  05/28/18 07:54  05/28/18 09:52  05/28/18 07:54














Results





- Labs


CBC & Chem 7: 


 05/27/18 04:58





 05/27/18 04:58


Labs: 


 Laboratory Last Values











WBC  5.4 K/mm3 (4.5-11.0)   05/27/18  04:58    


 


RBC  4.27 M/mm3 (3.65-5.03)   05/27/18  04:58    


 


Hgb  12.9 gm/dl (10.1-14.3)   05/27/18  04:58    


 


Hct  36.5 % (30.3-42.9)   05/27/18  04:58    


 


MCV  86 fl (79-97)   05/27/18  04:58    


 


MCH  30 pg (28-32)   05/27/18  04:58    


 


MCHC  35 % (30-34)  H  05/27/18  04:58    


 


RDW  12.4 % (13.2-15.2)  L  05/27/18  04:58    


 


Plt Count  156 K/mm3 (140-440)   05/27/18  04:58    


 


Lymph % (Auto)  19.4 % (13.4-35.0)   05/27/18  04:58    


 


Mono % (Auto)  9.1 % (0.0-7.3)  H  05/27/18  04:58    


 


Eos % (Auto)  2.7 % (0.0-4.3)   05/27/18  04:58    


 


Baso % (Auto)  1.2 % (0.0-1.8)   05/27/18  04:58    


 


Lymph #  1.0 K/mm3 (1.2-5.4)  L  05/27/18  04:58    


 


Mono #  0.5 K/mm3 (0.0-0.8)   05/27/18  04:58    


 


Eos #  0.1 K/mm3 (0.0-0.4)   05/27/18  04:58    


 


Baso #  0.1 K/mm3 (0.0-0.1)   05/27/18  04:58    


 


Seg Neutrophils %  67.6 % (40.0-70.0)   05/27/18  04:58    


 


Seg Neutrophils #  3.7 K/mm3 (1.8-7.7)   05/27/18  04:58    


 


Sodium  136 mmol/L (137-145)  L  05/27/18  04:58    


 


Potassium  4.6 mmol/L (3.6-5.0)  D 05/27/18  04:58    


 


Chloride  97.4 mmol/L ()  L  05/27/18  04:58    


 


Carbon Dioxide  23 mmol/L (22-30)   05/27/18  04:58    


 


Anion Gap  20 mmol/L  05/27/18  04:58    


 


BUN  10 mg/dL (7-17)   05/27/18  04:58    


 


Creatinine  0.6 mg/dL (0.7-1.2)  L  05/27/18  04:58    


 


Estimated GFR  > 60 ml/min  05/27/18  04:58    


 


BUN/Creatinine Ratio  17 %  05/27/18  04:58    


 


Glucose  212 mg/dL ()  H  05/27/18  04:58    


 


POC Glucose  207  ()  H  05/28/18  11:49    


 


Hemoglobin A1c  8.5 % (4-6)  H  05/26/18  23:42    


 


Calcium  8.5 mg/dL (8.4-10.2)   05/27/18  04:58    


 


Total Bilirubin  0.50 mg/dL (0.1-1.2)   05/27/18  04:58    


 


AST  11 units/L (5-40)   05/27/18  04:58    


 


ALT  9 units/L (7-56)   05/27/18  04:58    


 


Alkaline Phosphatase  70 units/L ()   05/27/18  04:58    


 


Total Protein  5.6 g/dL (6.3-8.2)  L  05/27/18  04:58    


 


Albumin  3.1 g/dL (3.9-5)  L  05/27/18  04:58    


 


Albumin/Globulin Ratio  1.2 %  05/27/18  04:58    


 


Urine Color  Yellow  (Yellow)   05/26/18  07:36    


 


Urine Turbidity  Clear  (Clear)   05/26/18  07:36    


 


Urine pH  5.0  (5.0-7.0)   05/26/18  07:36    


 


Ur Specific Gravity  1.020  (1.003-1.030)   05/26/18  07:36    


 


Urine Protein  <15 mg/dl mg/dL (Negative)   05/26/18  07:36    


 


Urine Glucose (UA)  >=500 mg/dL (Negative)   05/26/18  07:36    


 


Urine Ketones  Tr mg/dL (Negative)   05/26/18  07:36    


 


Urine Blood  Neg  (Negative)   05/26/18  07:36    


 


Urine Nitrite  Neg  (Negative)   05/26/18  07:36    


 


Urine Bilirubin  Neg  (Negative)   05/26/18  07:36    


 


Urine Urobilinogen  < 2.0 mg/dL (<2.0)   05/26/18  07:36    


 


Ur Leukocyte Esterase  Neg  (Negative)   05/26/18  07:36    


 


Urine WBC (Auto)  2.0 /HPF (0.0-6.0)   05/26/18  07:36    


 


Urine RBC (Auto)  3.0 /HPF (0.0-6.0)   05/26/18  07:36    


 


U Epithel Cells (Auto)  1.0 /HPF (0-13.0)   05/26/18  07:36    


 


Hyaline Casts  1 /LPF  05/26/18  07:36

## 2018-05-28 NOTE — GASTROENTEROLOGY PROGRESS NOTE
Assessment and Plan


GI: pt w/ pancreatic mass w/ liver lesions


- concern for pancreatic cancer


- will review ct scan and consider EUS vs ct guided liver bx


- await ca 19-9


- will follow








Subjective


Date of service: 05/28/18


Interval history: 


- denies any GI complaints








Objective





- Constitutional


Vitals: 


 











Temp Pulse Resp BP Pulse Ox


 


 97.4 F L  87   20   175/65   100 


 


 05/28/18 07:54  05/28/18 09:52  05/28/18 07:54  05/28/18 09:52  05/28/18 07:54











General appearance: no acute distress





- EENT


Eyes: PERRL





- Respiratory


Respiratory: bilateral: CTA





- Cardiovascular


Rhythm: regular


Heart Sounds: Present: S1 & S2





- Gastrointestinal


General gastrointestinal: Present: soft, non-tender, non-distended





- Labs


CBC & Chem 7: 


 05/27/18 04:58





 05/27/18 04:58


Labs: 


 Laboratory Results - last 24 hr











  05/27/18 05/27/18 05/27/18





  11:51 17:05 21:35


 


POC Glucose  202 H  230 H  289 H














  05/28/18 05/28/18





  05:38 11:49


 


POC Glucose  223 H  207 H

## 2018-05-29 VITALS — SYSTOLIC BLOOD PRESSURE: 135 MMHG | DIASTOLIC BLOOD PRESSURE: 62 MMHG

## 2018-05-29 LAB
BUN SERPL-MCNC: 9 MG/DL (ref 7–17)
BUN/CREAT SERPL: 13 %
CALCIUM SERPL-MCNC: 8.1 MG/DL (ref 8.4–10.2)
HCT VFR BLD CALC: 37.6 % (ref 30.3–42.9)
HEMOLYSIS INDEX: 5
HGB BLD-MCNC: 13.1 GM/DL (ref 10.1–14.3)
MCH RBC QN AUTO: 30 PG (ref 28–32)
MCHC RBC AUTO-ENTMCNC: 35 % (ref 30–34)
MCV RBC AUTO: 86 FL (ref 79–97)
PLATELET # BLD: 220 K/MM3 (ref 140–440)
RBC # BLD AUTO: 4.36 M/MM3 (ref 3.65–5.03)

## 2018-05-29 RX ADMIN — SODIUM CHLORIDE SCH MLS/HR: 0.9 INJECTION, SOLUTION INTRAVENOUS at 11:10

## 2018-05-29 RX ADMIN — CARVEDILOL SCH MG: 25 TABLET, FILM COATED ORAL at 11:23

## 2018-05-29 RX ADMIN — LOSARTAN POTASSIUM SCH MG: 50 TABLET, FILM COATED ORAL at 11:24

## 2018-05-29 RX ADMIN — Medication SCH ML: at 11:25

## 2018-05-29 RX ADMIN — AMLODIPINE BESYLATE SCH MG: 5 TABLET ORAL at 11:24

## 2018-05-29 RX ADMIN — HYDROCHLOROTHIAZIDE SCH MG: 25 TABLET ORAL at 11:23

## 2018-05-30 NOTE — EVENT NOTE
Date: 05/29/18





Pt seen and examined on the day of discharge on 5/29/18; please refer to 

discharge summary dated 5/28/18

## 2018-05-30 NOTE — MAGNETIC RESONANCE REPORT
MR ABDOMEN WITH AND WITHOUT CONTRAST



History: Pancreatic mass.



Technique: Multiple T1 and T2-weighted images with and without fat 

suppression. Post contrast T1 fat-sat imaging following IV gadolinium.



Comparison: CT abdomen pelvis with contrast performed 5/26/18.



Findings:



Small layering pleural effusions are identified at both lung bases 

measuring approximately 1 cm in thickness. Normal heart size.



There is moderate ascites throughout the abdomen which has increased 

slightly since the previous CT.



MRI also demonstrates a complex, heterogeneous, partially cystic mass 

which appears to arise from the pancreatic neck measuring 5.6 x 4.0 x 

5.6 cm. This mass displaces the antrum of the stomach anteriorly. The 

tail of the pancreas is atrophic with ductal dilatation measuring up to 

1 cm. No acute inflammation is identified in the pancreas. The mass 

does appear to encircle a portion of the superior mesenteric artery.



7 similar appearing heterogeneous liver masses are identified. There 

are 2 lesions in the left hepatic lobe measuring 1.5 cm and 2 cm. There 

are 5 lesions in the right hepatic lobe ranging from 1.5 cm to 3.4 cm. 

No underlying parenchymal liver disease is appreciated.



The biliary system, spleen, kidneys, adrenal glands and aorta are 

within normal limits.



There is no evidence for bowel obstruction.



There is subtle enhancement and scattered nodularity of the peritoneum 

particularly lateral to the liver consistent with peritoneal metastasis.



IMPRESSION:

Complex pancreatic mass as outlined above concerning for neoplasm. At 

least 7 liver lesions are identified consistent with metastasis. There 

is also subtle enhancement and nodularity of the peritoneum suggesting 

peritoneal metastasis.

Ascites.

Small bilateral pleural effusions.

## 2018-05-30 NOTE — CAT SCAN REPORT
CT ABDOMEN PELVIS WITH CONTRAST:



HISTORY:  Diffuse abdominal pain.



COMPARISON: Report from a CT abdomen pelvis dated 12/4/11. The images 

are not available.



TECHNIQUE:  Helical CT in 1.25mm intervals following IV contrast. 

Sagittal and coronal reconstructions. 





FINDINGS:



Lung bases: Normal heart size. Trace left pleural effusion is 

identified. The visualized lung bases are adequately aerated.



Liver: Approximately 7 ill-defined, hypodense liver lesions are 

identified concerning for metastatic disease.



Biliary system: Normal.



Pancreas: An approximate 4.2 x 5.0 cm heterogeneous mass is identified 

at the base of the mesentery which appears to involve the pancreatic 

head/neck. There is dilatation of the pancreatic duct up to 1 cm. No 

obvious inflammatory changes.



Spleen: Normal.



Kidneys/ureters/bladder: Normal.



Adrenal glands: Normal.



Aorta: Normal.



Intestines: No oral contrast was administered which limits full 

evaluation of the bowel loops. No evidence for obstruction or focal 

inflammation.



Appendix: Not confidently identified, correlate with surgical history.



Pelvic viscera: A 2.3 cm cyst is identified in the right adnexa. The 

left adnexa is unremarkable. The uterus is poorly delineated on this 

exam. A uterine-like structure containing numerous calcifications is 

identified in the expected position of the uterus. This presumably 

represents uterine fibroid disease. Otherwise a pelvic mass is present. 

Please correlate with the patient's surgical history.



Ascites: Small to medium ascites in the abdomen and pelvis.



Adenopathy: None.



Musculoskeletal: Intact. There is multilevel degenerative change. No 

fracture or suspicious bony lesion.





IMPRESSION:

Complex pancreatic mass and multiple liver masses concerning for 

metastatic pancreatic cancer.

Small to medium ascites.

Trace left pleural effusion.

Uterine fibroid disease? Pelvic mass? Please correlate with the 

patient's clinical history.